# Patient Record
Sex: FEMALE | Race: WHITE | NOT HISPANIC OR LATINO | Employment: FULL TIME | URBAN - METROPOLITAN AREA
[De-identification: names, ages, dates, MRNs, and addresses within clinical notes are randomized per-mention and may not be internally consistent; named-entity substitution may affect disease eponyms.]

---

## 2017-01-19 ENCOUNTER — OFFICE VISIT (OUTPATIENT)
Dept: RETAIL CLINIC | Facility: CLINIC | Age: 32
End: 2017-01-19

## 2017-01-19 VITALS
SYSTOLIC BLOOD PRESSURE: 118 MMHG | OXYGEN SATURATION: 98 % | HEART RATE: 80 BPM | RESPIRATION RATE: 16 BRPM | DIASTOLIC BLOOD PRESSURE: 72 MMHG | TEMPERATURE: 98.2 F

## 2017-01-19 DIAGNOSIS — H61.21 IMPACTED CERUMEN OF RIGHT EAR: ICD-10-CM

## 2017-01-19 DIAGNOSIS — H66.001 ACUTE SUPPURATIVE OTITIS MEDIA OF RIGHT EAR WITHOUT SPONTANEOUS RUPTURE OF TYMPANIC MEMBRANE, RECURRENCE NOT SPECIFIED: Primary | ICD-10-CM

## 2017-01-19 PROBLEM — I10 HYPERTENSION: Status: ACTIVE | Noted: 2017-01-19

## 2017-01-19 PROBLEM — H92.09 EAR ACHE: Status: ACTIVE | Noted: 2017-01-19

## 2017-01-19 PROBLEM — H61.20 HEARING LOSS DUE TO CERUMEN IMPACTION: Status: ACTIVE | Noted: 2017-01-19

## 2017-01-19 PROBLEM — J30.2 SEASONAL ALLERGIC RHINITIS: Status: ACTIVE | Noted: 2017-01-19

## 2017-01-19 PROCEDURE — 69209 REMOVE IMPACTED EAR WAX UNI: CPT | Performed by: NURSE PRACTITIONER

## 2017-01-19 PROCEDURE — 99213 OFFICE O/P EST LOW 20 MIN: CPT | Performed by: NURSE PRACTITIONER

## 2017-01-19 RX ORDER — AMOXICILLIN 875 MG/1
875 TABLET, COATED ORAL 2 TIMES DAILY
Qty: 20 TABLET | Refills: 0 | Status: SHIPPED | OUTPATIENT
Start: 2017-01-19 | End: 2017-06-28

## 2017-01-19 NOTE — PATIENT INSTRUCTIONS
Otitis Media, Adult  Otitis media is redness, soreness, and inflammation of the middle ear. Otitis media may be caused by allergies or, most commonly, by infection. Often it occurs as a complication of the common cold.  SIGNS AND SYMPTOMS  Symptoms of otitis media may include:  · Earache.  · Fever.  · Ringing in your ear.  · Headache.  · Leakage of fluid from the ear.  DIAGNOSIS  To diagnose otitis media, your health care provider will examine your ear with an otoscope. This is an instrument that allows your health care provider to see into your ear in order to examine your eardrum. Your health care provider also will ask you questions about your symptoms.  TREATMENT   Typically, otitis media resolves on its own within 3-5 days. Your health care provider may prescribe medicine to ease your symptoms of pain. If otitis media does not resolve within 5 days or is recurrent, your health care provider may prescribe antibiotic medicines if he or she suspects that a bacterial infection is the cause.  HOME CARE INSTRUCTIONS   · If you were prescribed an antibiotic medicine, finish it all even if you start to feel better.  · Take medicines only as directed by your health care provider.  · Keep all follow-up visits as directed by your health care provider.  SEEK MEDICAL CARE IF:  · You have otitis media only in one ear, or bleeding from your nose, or both.  · You notice a lump on your neck.  · You are not getting better in 3-5 days.  · You feel worse instead of better.  SEEK IMMEDIATE MEDICAL CARE IF:   · You have pain that is not controlled with medicine.  · You have swelling, redness, or pain around your ear or stiffness in your neck.  · You notice that part of your face is paralyzed.  · You notice that the bone behind your ear (mastoid) is tender when you touch it.  MAKE SURE YOU:   · Understand these instructions.  · Will watch your condition.  · Will get help right away if you are not doing well or get worse.     This  information is not intended to replace advice given to you by your health care provider. Make sure you discuss any questions you have with your health care provider.     Document Released: 09/22/2005 Document Revised: 01/08/2016 Document Reviewed: 07/15/2014  [employer] Interactive Patient Education ©2016 [employer]  Cerumen Impaction  The structures of the external ear canal secrete a waxy substance known as cerumen. Excess cerumen can build up in the ear canal, causing a condition known as cerumen impaction. Cerumen impaction can cause ear pain and disrupt the function of the ear.  The rate of cerumen production differs for each individual. In certain individuals, the configuration of the ear canal may decrease his or her ability to naturally remove cerumen.  CAUSES  Cerumen impaction is caused by excessive cerumen production or buildup.  RISK FACTORS  · Frequent use of swabs to clean ears.  · Having narrow ear canals.  · Having eczema.  · Being dehydrated.  SIGNS AND SYMPTOMS  · Diminished hearing.  · Ear drainage.  · Ear pain.  · Ear itch.  TREATMENT  Treatment may involve:  · Over-the-counter or prescription ear drops to soften the cerumen.  · Removal of cerumen by a health care provider. This may be done with:    Irrigation with warm water. This is the most common method of removal.    Ear curettes and other instruments.    Surgery. This may be done in severe cases.  HOME CARE INSTRUCTIONS  · Take medicines only as directed by your health care provider.  · Do not insert objects into the ear with the intent of cleaning the ear.  PREVENTION  · Do not insert objects into the ear, even with the intent of cleaning the ear. Removing cerumen as a part of normal hygiene is not necessary, and the use of swabs in the ear canal is not recommended.  · Drink enough water to keep your urine clear or pale yellow.  · Control your eczema if you have it.  SEEK MEDICAL CARE IF:  · You develop ear pain.  · You develop bleeding  from the ear.  · The cerumen does not clear after you use ear drops as directed.     This information is not intended to replace advice given to you by your health care provider. Make sure you discuss any questions you have with your health care provider.     Document Released: 01/25/2006 Document Revised: 01/08/2016 Document Reviewed: 08/03/2016  [employer] Interactive Patient Education ©2016 [employer]  Talked to the patient about the diagnosis and educate the patient and advise to visit to PCP if the symptoms worsens

## 2017-01-19 NOTE — MR AVS SNAPSHOT
Elzbieta Cheri   1/19/2017 8:00 AM   Office Visit    Dept Phone:  [phone]   Encounter #:  [id number]    Provider:  KRISTINA HUNTER   Department:  [facility]                Your Full Care Plan              Today's Medication Changes          These changes are accurate as of: 1/19/17  8:32 AM.  If you have any questions, ask your nurse or doctor.               New Medication(s)Ordered:     amoxicillin 875 MG tablet   Commonly known as:  AMOXIL   Take 1 tablet by mouth 2 (Two) Times a Day.         Stop taking medication(s)listed here:     albuterol 108 (90 BASE) MCG/ACT inhaler   Commonly known as:  PROVENTIL HFA;VENTOLIN HFA                Where to Get Your Medications      [facility] [place] [facility] [place] [address] [address] [facility] [address] - [phone]  - [phone] [facility] [address] [facility] [address]     Phone:  [phone]     amoxicillin 875 MG tablet                  Your Updated Medication List          This list is accurate as of: 1/19/17  8:32 AM.  Always use your most recent med list.                amoxicillin 875 MG tablet   Commonly known as:  AMOXIL   Take 1 tablet by mouth 2 (Two) Times a Day.               You Were Diagnosed With        Codes Comments    Impacted cerumen of right ear    -  Primary ICD-10-CM: H61.21  ICD-9-CM: 380.4       Instructions    Otitis Media, Adult  Otitis media is redness, soreness, and inflammation of the middle ear. Otitis media may be caused by allergies or, most commonly, by infection. Often it occurs as a complication of the common cold.  SIGNS AND SYMPTOMS  Symptoms of otitis media may include:  · Earache.  · Fever.  · Ringing in your ear.  · Headache.  · Leakage of fluid from the ear.  DIAGNOSIS  To diagnose otitis media, your health care provider will examine your ear with an otoscope. This is an instrument that allows your health care provider to see  into your ear in order to examine your eardrum. Your health care provider also will ask you questions about your symptoms.  TREATMENT   Typically, otitis media resolves on its own within 3-5 days. Your health care provider may prescribe medicine to ease your symptoms of pain. If otitis media does not resolve within 5 days or is recurrent, your health care provider may prescribe antibiotic medicines if he or she suspects that a bacterial infection is the cause.  HOME CARE INSTRUCTIONS   · If you were prescribed an antibiotic medicine, finish it all even if you start to feel better.  · Take medicines only as directed by your health care provider.  · Keep all follow-up visits as directed by your health care provider.  SEEK MEDICAL CARE IF:  · You have otitis media only in one ear, or bleeding from your nose, or both.  · You notice a lump on your neck.  · You are not getting better in 3-5 days.  · You feel worse instead of better.  SEEK IMMEDIATE MEDICAL CARE IF:   · You have pain that is not controlled with medicine.  · You have swelling, redness, or pain around your ear or stiffness in your neck.  · You notice that part of your face is paralyzed.  · You notice that the bone behind your ear (mastoid) is tender when you touch it.  MAKE SURE YOU:   · Understand these instructions.  · Will watch your condition.  · Will get help right away if you are not doing well or get worse.     This information is not intended to replace advice given to you by your health care provider. Make sure you discuss any questions you have with your health care provider.     Document Released: 09/22/2005 Document Revised: 01/08/2016 Document Reviewed: 07/15/2014  [employer] Interactive Patient Education ©2016 [employer]  Cerumen Impaction  The structures of the external ear canal secrete a waxy substance known as cerumen. Excess cerumen can build up in the ear canal, causing a condition known as cerumen impaction. Cerumen impaction can cause  ear pain and disrupt the function of the ear.  The rate of cerumen production differs for each individual. In certain individuals, the configuration of the ear canal may decrease his or her ability to naturally remove cerumen.  CAUSES  Cerumen impaction is caused by excessive cerumen production or buildup.  RISK FACTORS  · Frequent use of swabs to clean ears.  · Having narrow ear canals.  · Having eczema.  · Being dehydrated.  SIGNS AND SYMPTOMS  · Diminished hearing.  · Ear drainage.  · Ear pain.  · Ear itch.  TREATMENT  Treatment may involve:  · Over-the-counter or prescription ear drops to soften the cerumen.  · Removal of cerumen by a health care provider. This may be done with:    Irrigation with warm water. This is the most common method of removal.    Ear curettes and other instruments.    Surgery. This may be done in severe cases.  HOME CARE INSTRUCTIONS  · Take medicines only as directed by your health care provider.  · Do not insert objects into the ear with the intent of cleaning the ear.  PREVENTION  · Do not insert objects into the ear, even with the intent of cleaning the ear. Removing cerumen as a part of normal hygiene is not necessary, and the use of swabs in the ear canal is not recommended.  · Drink enough water to keep your urine clear or pale yellow.  · Control your eczema if you have it.  SEEK MEDICAL CARE IF:  · You develop ear pain.  · You develop bleeding from the ear.  · The cerumen does not clear after you use ear drops as directed.     This information is not intended to replace advice given to you by your health care provider. Make sure you discuss any questions you have with your health care provider.     Document Released: 01/25/2006 Document Revised: 01/08/2016 Document Reviewed: 08/03/2016  [employer] Interactive Patient Education ©2016 [employer]  Talked to the patient about the diagnosis and educate the patient and advise to visit to PCP if the symptoms worsens     Patient  Instructions History      Upcoming Appointments     Visit Type Date Time Department    OFFICE VISIT 2017  8:00 AM [facility]      [employer] Signup     [facility] [employer] allows you to send messages to your doctor, view your test results, renew your prescriptions, schedule appointments, and more. To sign up, go to [employer] and click on the Sign Up Now link in the New User? box. Enter your [employer] Activation Code exactly as it appears below along with the last four digits of your Social Security Number and your Date of Birth () to complete the sign-up process. If you do not sign up before the expiration date, you must request a new code.    [employer] Activation Code: [email or web address]  Expires: 2017  8:32 AM    If you have questions, you can email [email or web address] or call [phone] to talk to our [employer] staff. Remember, [employer] is NOT to be used for urgent needs. For medical emergencies, dial 911.               Other Info from Your Visit           Allergies     Codeine  Hives      Reason for Visit     Earache     Cerumen Impaction           Vital Signs     Blood Pressure Pulse Temperature Respirations Oxygen Saturation Smoking Status    118/72 80 98.2 °F (36.8 °C) 16 98% Never Smoker      Problems and Diagnoses Noted     Ear ache    Hearing loss due to cerumen impaction    High blood pressure    Seasonal allergic reaction    Excess wax in ear    -  Primary

## 2017-01-19 NOTE — PROGRESS NOTES
Subjective   Elzbieta Alonzo is a 31 y.o. female.     Earache    There is pain in the right ear. This is a recurrent problem. The current episode started in the past 7 days. The problem has been gradually worsening. There has been no fever. The pain is moderate. Associated symptoms include hearing loss. Pertinent negatives include no ear discharge. Associated symptoms comments: Impacted cerumen in the right ear . She has tried nothing for the symptoms. Her past medical history is significant for a chronic ear infection and hearing loss. There is no history of a tympanostomy tube.   Ear Fullness    There is pain in the right (clogging up right ear from 4 months) ear. The current episode started more than 1 month ago. The problem has been unchanged. Associated symptoms include hearing loss. Pertinent negatives include no ear discharge. Her past medical history is significant for a chronic ear infection and hearing loss. There is no history of a tympanostomy tube.        The following portions of the patient's history were reviewed and updated as appropriate: allergies, current medications, past family history, past medical history, past social history, past surgical history and problem list.    Review of Systems   Constitutional: Negative for diaphoresis, fatigue and fever.   HENT: Positive for ear pain and hearing loss. Negative for ear discharge.         Clogging up right ear   Eyes: Negative.    Respiratory: Negative.    Cardiovascular: Negative.    Gastrointestinal: Negative.        Objective   Physical Exam   Constitutional: She is oriented to person, place, and time. She appears well-developed and well-nourished.   HENT:   Head: Normocephalic and atraumatic.   Right Ear: Tympanic membrane is injected, erythematous and bulging. Tympanic membrane mobility is abnormal.   Left Ear: Tympanic membrane and external ear normal.   Nose: Right sinus exhibits no maxillary sinus tenderness and no frontal sinus tenderness. Left  sinus exhibits no maxillary sinus tenderness and no frontal sinus tenderness.   Mouth/Throat: No oropharyngeal exudate, posterior oropharyngeal edema or posterior oropharyngeal erythema.   Impacted cerumen in the right ear  Ear irrigation is done and removed a lot of wax  Post irrigation examined back again   Has injected right ear  Able to hear well from the right ear     Cardiovascular: Normal rate and regular rhythm.    Pulmonary/Chest: Effort normal and breath sounds normal. She has no decreased breath sounds. She has no wheezes. She has no rhonchi. She has no rales.   Abdominal: Soft. Bowel sounds are normal.   Lymphadenopathy:     She has no cervical adenopathy.   Neurological: She is oriented to person, place, and time.   Nursing note and vitals reviewed.      Assessment/Plan   Elzbieta was seen today for earache and cerumen impaction.    Diagnoses and all orders for this visit:    Impacted cerumen of right ear  -     Ear Cerumen Removal Lavage    Acute suppurative otitis media of right ear without spontaneous rupture of tympanic membrane, recurrence not specified  -     amoxicillin (AMOXIL) 875 MG tablet; Take 1 tablet by mouth 2 (Two) Times a Day.      Talked to the patient about the diagnosis and educate the patient and advise to visit to PCP if the symptoms worsens

## 2017-06-28 ENCOUNTER — OFFICE VISIT (OUTPATIENT)
Dept: RETAIL CLINIC | Facility: CLINIC | Age: 32
End: 2017-06-28

## 2017-06-28 VITALS
DIASTOLIC BLOOD PRESSURE: 70 MMHG | RESPIRATION RATE: 18 BRPM | SYSTOLIC BLOOD PRESSURE: 116 MMHG | OXYGEN SATURATION: 98 % | HEART RATE: 74 BPM | TEMPERATURE: 98.1 F

## 2017-06-28 DIAGNOSIS — H61.23 BILATERAL IMPACTED CERUMEN: Primary | ICD-10-CM

## 2017-06-28 DIAGNOSIS — H61.23 IMPACTED CERUMEN, BILATERAL: ICD-10-CM

## 2017-06-28 PROCEDURE — 69209 REMOVE IMPACTED EAR WAX UNI: CPT | Performed by: NURSE PRACTITIONER

## 2017-06-28 PROCEDURE — 99213 OFFICE O/P EST LOW 20 MIN: CPT | Performed by: NURSE PRACTITIONER

## 2017-06-28 NOTE — PATIENT INSTRUCTIONS
Earwax Buildup  Your ears make a substance called earwax. It may also be called cerumen. Sometimes, too much earwax builds up in your ear canal. This can cause ear pain and make it harder for you to hear.  CAUSES  This condition is caused by too much earwax production or buildup.  RISK FACTORS  The following factors may make you more likely to develop this condition:  · Cleaning your ears often with swabs.  · Having narrow ear canals.  · Having earwax that is overly thick or sticky.  · Having eczema.  · Being dehydrated.  SYMPTOMS  Symptoms of this condition include:  · Reduced hearing.  · Ear drainage.  · Ear pain.  · Ear itch.  · A feeling of fullness in the ear or feeling that the ear is plugged.  · Ringing in the ear.  · Coughing.  DIAGNOSIS  Your health care provider can diagnose this condition based on your symptoms and medical history. Your health care provider will also do an ear exam to look inside your ear with a scope (otoscope). You may also have a hearing test.  TREATMENT  Treatment for this condition includes:  · Over-the-counter or prescription ear drops to soften the earwax.  · Earwax removal by a health care provider. This may be done:    By flushing the ear with body-temperature water.    With a medical instrument that has a loop at the end (earwax curette).    With a suction device.  HOME CARE INSTRUCTIONS  · Take over-the-counter and prescription medicines only as told by your health care provider.  · Do not put any objects, including an ear swab, into your ear. You can clean the opening of your ear canal with a washcloth.  · Drink enough water to keep your urine clear or pale yellow.  · If you have frequent earwax buildup or you use hearing aids, consider seeing your health care provider every 6-12 months for routine preventive ear cleanings. Keep all follow-up visits as told by your health care provider.  SEEK MEDICAL CARE IF:  · You have ear pain.  · Your condition does not improve with  treatment.  · You have hearing loss.  · You have blood, pus, or other fluid coming from your ear.     This information is not intended to replace advice given to you by your health care provider. Make sure you discuss any questions you have with your health care provider.     Document Released: 01/25/2006 Document Revised: 04/10/2017 Document Reviewed: 08/03/2016  [employer] Interactive Patient Education ©2017 [employer]

## 2017-11-02 ENCOUNTER — OFFICE VISIT (OUTPATIENT)
Dept: RETAIL CLINIC | Facility: CLINIC | Age: 32
End: 2017-11-02

## 2017-11-02 VITALS
RESPIRATION RATE: 16 BRPM | HEART RATE: 80 BPM | OXYGEN SATURATION: 97 % | TEMPERATURE: 98 F | DIASTOLIC BLOOD PRESSURE: 64 MMHG | SYSTOLIC BLOOD PRESSURE: 90 MMHG

## 2017-11-02 DIAGNOSIS — R30.0 DYSURIA: Primary | ICD-10-CM

## 2017-11-02 DIAGNOSIS — H66.002 ACUTE SUPPURATIVE OTITIS MEDIA OF LEFT EAR WITHOUT SPONTANEOUS RUPTURE OF TYMPANIC MEMBRANE, RECURRENCE NOT SPECIFIED: ICD-10-CM

## 2017-11-02 LAB
BILIRUB BLD-MCNC: NEGATIVE MG/DL
CLARITY, POC: ABNORMAL
COLOR UR: ABNORMAL
GLUCOSE UR STRIP-MCNC: NEGATIVE MG/DL
KETONES UR QL: NEGATIVE
LEUKOCYTE EST, POC: ABNORMAL
NITRITE UR-MCNC: NEGATIVE MG/ML
PH UR: 6 [PH] (ref 5–8)
PROT UR STRIP-MCNC: NEGATIVE MG/DL
RBC # UR STRIP: ABNORMAL /UL
SP GR UR: 1.03 (ref 1–1.03)
UROBILINOGEN UR QL: NORMAL

## 2017-11-02 PROCEDURE — 81003 URINALYSIS AUTO W/O SCOPE: CPT | Performed by: NURSE PRACTITIONER

## 2017-11-02 PROCEDURE — 99213 OFFICE O/P EST LOW 20 MIN: CPT | Performed by: NURSE PRACTITIONER

## 2017-11-02 RX ORDER — AMOXICILLIN AND CLAVULANATE POTASSIUM 875; 125 MG/1; MG/1
1 TABLET, FILM COATED ORAL 2 TIMES DAILY
Qty: 20 TABLET | Refills: 0 | Status: SHIPPED | OUTPATIENT
Start: 2017-11-02 | End: 2017-11-12

## 2017-11-02 NOTE — PATIENT INSTRUCTIONS
Otitis Media, Adult  Otitis media is redness, soreness, and inflammation of the middle ear. Otitis media may be caused by allergies or, most commonly, by infection. Often it occurs as a complication of the common cold.  SIGNS AND SYMPTOMS  Symptoms of otitis media may include:  · Earache.  · Fever.  · Ringing in your ear.  · Headache.  · Leakage of fluid from the ear.  DIAGNOSIS  To diagnose otitis media, your health care provider will examine your ear with an otoscope. This is an instrument that allows your health care provider to see into your ear in order to examine your eardrum. Your health care provider also will ask you questions about your symptoms.  TREATMENT   Typically, otitis media resolves on its own within 3-5 days. Your health care provider may prescribe medicine to ease your symptoms of pain. If otitis media does not resolve within 5 days or is recurrent, your health care provider may prescribe antibiotic medicines if he or she suspects that a bacterial infection is the cause.  HOME CARE INSTRUCTIONS   · If you were prescribed an antibiotic medicine, finish it all even if you start to feel better.  · Take medicines only as directed by your health care provider.  · Keep all follow-up visits as directed by your health care provider.  SEEK MEDICAL CARE IF:  · You have otitis media only in one ear, or bleeding from your nose, or both.  · You notice a lump on your neck.  · You are not getting better in 3-5 days.  · You feel worse instead of better.  SEEK IMMEDIATE MEDICAL CARE IF:   · You have pain that is not controlled with medicine.  · You have swelling, redness, or pain around your ear or stiffness in your neck.  · You notice that part of your face is paralyzed.  · You notice that the bone behind your ear (mastoid) is tender when you touch it.  MAKE SURE YOU:   · Understand these instructions.  · Will watch your condition.  · Will get help right away if you are not doing well or get worse.     This  information is not intended to replace advice given to you by your health care provider. Make sure you discuss any questions you have with your health care provider.     Document Released: 09/22/2005 Document Revised: 04/10/2017 Document Reviewed: 07/15/2014  [employer] Interactive Patient Education ©2017 [employer]  Dysuria  Dysuria is pain or discomfort while urinating. The pain or discomfort may be felt in the tube that carries urine out of the bladder (urethra) or in the surrounding tissue of the genitals. The pain may also be felt in the groin area, lower abdomen, and lower back. You may have to urinate frequently or have the sudden feeling that you have to urinate (urgency). Dysuria can affect both men and women, but is more common in women.  Dysuria can be caused by many different things, including:  · Urinary tract infection in women.  · Infection of the kidney or bladder.  · Kidney stones or bladder stones.  · Certain sexually transmitted infections (STIs), such as chlamydia.  · Dehydration.  · Inflammation of the vagina.  · Use of certain medicines.  · Use of certain soaps or scented products that cause irritation.  HOME CARE INSTRUCTIONS  Watch your dysuria for any changes. The following actions may help to reduce any discomfort you are feeling:  · Drink enough fluid to keep your urine clear or pale yellow.  · Empty your bladder often. Avoid holding urine for long periods of time.  · After a bowel movement or urination, women should cleanse from front to back, using each tissue only once.  · Empty your bladder after sexual intercourse.  · Take medicines only as directed by your health care provider.  · If you were prescribed an antibiotic medicine, finish it all even if you start to feel better.  · Avoid caffeine, tea, and alcohol. They can irritate the bladder and make dysuria worse. In men, alcohol may irritate the prostate.  · Keep all follow-up visits as directed by your health care provider. This is  important.  · If you had any tests done to find the cause of dysuria, it is your responsibility to obtain your test results. Ask the lab or department performing the test when and how you will get your results. Talk with your health care provider if you have any questions about your results.  SEEK MEDICAL CARE IF:  · You develop pain in your back or sides.  · You have a fever.  · You have nausea or vomiting.  · You have blood in your urine.  · You are not urinating as often as you usually do.  SEEK IMMEDIATE MEDICAL CARE IF:  · You pain is severe and not relieved with medicines.  · You are unable to hold down any fluids.  · You or someone else notices a change in your mental function.  · You have a rapid heartbeat at rest.  · You have shaking or chills.  · You feel extremely weak.     This information is not intended to replace advice given to you by your health care provider. Make sure you discuss any questions you have with your health care provider.     Document Released: 09/15/2005 Document Revised: 04/10/2017 Document Reviewed: 08/13/2015  [employer] Interactive Patient Education ©2017 [employer]

## 2017-11-02 NOTE — PROGRESS NOTES
Subjective   Patient ID: Elzbieta Alonzo is a 32 y.o. female presents with   Chief Complaint   Patient presents with   • Earache     x 2 days   • Urinary Tract Infection     x 1 week burning       Earache    There is pain in the left ear. This is a new problem. The current episode started in the past 7 days (2d). The problem occurs every few hours. The problem has been gradually worsening. There has been no fever. The pain is mild. Associated symptoms include coughing (mild, dry). Pertinent negatives include no ear discharge, hearing loss, rhinorrhea or sore throat. Treatments tried: peroxide. The treatment provided no relief. There is no history of a chronic ear infection, hearing loss or a tympanostomy tube.   Urinary Tract Infection    This is a new problem. The current episode started 1 to 4 weeks ago (1w). The problem occurs intermittently. The problem has been gradually improving. The quality of the pain is described as burning and aching. The pain is moderate. There has been no fever. She is sexually active. There is no history of pyelonephritis. Associated symptoms include frequency and urgency. Pertinent negatives include no chills, flank pain or hematuria. She has tried nothing for the symptoms. The treatment provided no relief. Her past medical history is significant for urinary stasis. There is no history of catheterization, kidney stones, recurrent UTIs, a single kidney or a urological procedure.       Allergies   Allergen Reactions   • Codeine Hives       The following portions of the patient's history were reviewed and updated as appropriate: allergies, current medications, past family history, past medical history, past social history, past surgical history and problem list.      Review of Systems   Constitutional: Negative for chills, diaphoresis, fatigue and fever.   HENT: Positive for ear pain and sneezing. Negative for congestion, ear discharge, hearing loss, postnasal drip, rhinorrhea, sinus pain,  sinus pressure and sore throat.    Respiratory: Positive for cough (mild, dry).    Cardiovascular: Negative.    Gastrointestinal: Negative.    Genitourinary: Positive for dysuria, frequency, pelvic pain and urgency. Negative for decreased urine volume, difficulty urinating, dyspareunia, enuresis, flank pain, genital sores, hematuria, menstrual problem, vaginal bleeding, vaginal discharge and vaginal pain.       Objective     Vitals:    11/02/17 1140   BP: 90/64   Pulse: 80   Resp: 16   Temp: 98 °F (36.7 °C)   SpO2: 97%         Physical Exam   Constitutional: She is oriented to person, place, and time. She appears well-developed and well-nourished. She does not appear ill. No distress.   HENT:   Head: Normocephalic.   Right Ear: Hearing, external ear and ear canal normal.   Left Ear: Hearing, external ear and ear canal normal. Tympanic membrane is erythematous and bulging.   Nose: Sinus tenderness present. No mucosal edema or rhinorrhea. Right sinus exhibits frontal sinus tenderness. Left sinus exhibits frontal sinus tenderness.   Mouth/Throat: Mucous membranes are normal. Posterior oropharyngeal erythema (mild) present. Tonsils are 2+ on the right. Tonsils are 2+ on the left. No tonsillar exudate.   Initially bilateral TMs not visualized due to cerumen impaction. Instilled 5 drops of sweet oil, allowed to sit for 5 min. Bilateral ears Irrigated with water. All cerumen removed from bilateral ears.      Eyes: Conjunctivae are normal.   Sclera white.   Neck: No tracheal deviation present.   Cardiovascular: Normal rate, regular rhythm, S1 normal, S2 normal and normal heart sounds.    Pulmonary/Chest: Effort normal and breath sounds normal. No accessory muscle usage. No respiratory distress.   Abdominal: Soft. Bowel sounds are normal. There is tenderness in the suprapubic area. There is no CVA tenderness.   Lymphadenopathy:     She has no cervical adenopathy.   Neurological: She is alert and oriented to person, place,  and time.   Skin: Skin is warm and dry.   Vitals reviewed.    Ref Range & Units 12:15 PM      Color Yellow, Straw, Dark Yellow, Vicki Vicki   Clarity, UA Clear Cloudy (A)   Glucose, UA Negative, 1000 mg/dL (3+) mg/dL Negative   Bilirubin Negative Negative   Ketones, UA Negative Negative   Specific Gravity  1.005 - 1.030 1.030   Blood, UA Negative Trace (A)   pH, Urine 5.0 - 8.0 6.0   Protein, POC Negative mg/dL Negative   Urobilinogen, UA Normal Normal   Leukocytes Negative Trace (A)   Nitrite, UA Negative Negative   Resulting Agency  [facility]         Elzbieta was seen today for earache and urinary tract infection.    Diagnoses and all orders for this visit:    Dysuria  -     Urine Culture, Comprehen ([facility]) - Urine, Clean Catch  -     POC Urinalysis Dipstick, Automated    Acute suppurative otitis media of left ear without spontaneous rupture of tympanic membrane, recurrence not specified  -     amoxicillin-clavulanate (AUGMENTIN) 875-125 MG per tablet; Take 1 tablet by mouth 2 (Two) Times a Day for 10 days.      Tylenol/ibuprofen  as needed for pain or fever per bottle instructions.  Increase water intake to at least 8 cups per day. Void pre/post coital. Void with first urge, do not hold urine. Decrease caffeine intake  Follow-up with Primary Care Physician in 48-72 hours if these symptoms worsen or fail to improve as anticipated. Patient verbalizes understanding.

## 2017-11-03 ENCOUNTER — TELEPHONE (OUTPATIENT)
Dept: RETAIL CLINIC | Facility: CLINIC | Age: 32
End: 2017-11-03

## 2017-11-03 NOTE — TELEPHONE ENCOUNTER
Patient called yesterday 11/1/2017 to report that once she got home, it still felt like water was in both ears and she spoke with her mom who is an NP and her mom told her to put a drop of alcohol in both ears. Patient reports that the alcohol dried up the right ear but still felt like it did nothing for the left ear. Advised her to put a cotton ball in it and let it dry on its own.    Patient calling back today to report that her left ear still feels like it has fluid in it and she can barely hear out of it. Advised that she can come back for me to look at it. Patient states that her son just laid down for a nap and that her mom, who is an NP will come by to look at it tomorrow and if she needs to come back up her after that she will. Advised that we will be here until 3 pm today if she changes her mind. Patient verbalized understanding.

## 2017-11-05 ENCOUNTER — DOCUMENTATION (OUTPATIENT)
Dept: RETAIL CLINIC | Facility: CLINIC | Age: 32
End: 2017-11-05

## 2017-11-05 NOTE — PROGRESS NOTES
Spoke with pt. Culture negative. Advised pt to f/u with PCP if symptoms return. Pt reports she is feeling better.